# Patient Record
Sex: FEMALE | URBAN - METROPOLITAN AREA
[De-identification: names, ages, dates, MRNs, and addresses within clinical notes are randomized per-mention and may not be internally consistent; named-entity substitution may affect disease eponyms.]

---

## 2019-02-14 ENCOUNTER — APPOINTMENT (RX ONLY)
Dept: URBAN - METROPOLITAN AREA CLINIC 4 | Facility: CLINIC | Age: 49
Setting detail: DERMATOLOGY
End: 2019-02-14

## 2019-02-14 DIAGNOSIS — D49.2 NEOPLASM OF UNSPECIFIED BEHAVIOR OF BONE, SOFT TISSUE, AND SKIN: ICD-10-CM

## 2019-02-14 DIAGNOSIS — L57.8 OTHER SKIN CHANGES DUE TO CHRONIC EXPOSURE TO NONIONIZING RADIATION: ICD-10-CM

## 2019-02-14 DIAGNOSIS — L72.8 OTHER FOLLICULAR CYSTS OF THE SKIN AND SUBCUTANEOUS TISSUE: ICD-10-CM

## 2019-02-14 PROCEDURE — ? BIOPSY BY PUNCH METHOD

## 2019-02-14 PROCEDURE — ? ADDITIONAL NOTES

## 2019-02-14 PROCEDURE — 11104 PUNCH BX SKIN SINGLE LESION: CPT

## 2019-02-14 PROCEDURE — 99202 OFFICE O/P NEW SF 15 MIN: CPT | Mod: 25

## 2019-02-14 PROCEDURE — ? COUNSELING

## 2019-02-14 ASSESSMENT — LOCATION DETAILED DESCRIPTION DERM
LOCATION DETAILED: RIGHT CHIN
LOCATION DETAILED: LEFT CENTRAL MALAR CHEEK

## 2019-02-14 ASSESSMENT — LOCATION SIMPLE DESCRIPTION DERM
LOCATION SIMPLE: CHIN
LOCATION SIMPLE: LEFT CHEEK

## 2019-02-14 ASSESSMENT — LOCATION ZONE DERM: LOCATION ZONE: FACE

## 2019-02-14 NOTE — HPI: SKIN LESION
Is This A New Presentation, Or A Follow-Up?: Skin Lesion
How Severe Is Your Skin Lesion?: mild
Has Your Skin Lesion Been Treated?: been treated
Additional History: Patient started using antibiotic ointment and Acyclovir with no improvement. \\nPatient also tried course of Keflex and Augmentin.
When Was It Treated?: 07/01/2018

## 2019-02-14 NOTE — PROCEDURE: ADDITIONAL NOTES
Additional Notes: Seen and evaluated by Dr. Johnson: Recommend a punch biopsy to rule out cyst vs BCC\\nDiscussed Treatment and risks in detail with patient . If it is a confirmed cyst, recommend excisional removal with Dr Raza.
Detail Level: Simple

## 2019-02-14 NOTE — PROCEDURE: BIOPSY BY PUNCH METHOD
Lab: [id number]
Hemostasis: Electrocautery
Patient Will Remove Sutures At Home?: No
X Depth Of Punch In Cm (Optional): 0
Biopsy Type: H and E
Epidermal Sutures: 5-0 Ethilon
Was A Bandage Applied: Yes
Home Suture Removal Text: Patient was provided a home suture removal kit and will remove their sutures at home.  If they have any questions or difficulties they will call the office.
Lab Facility: 
Notification Instructions: Patient will be notified of biopsy results. However, patient instructed to call the office if not contacted within 2 weeks.
Consent: Written consent was obtained and risks were reviewed including but not limited to scarring, infection, bleeding, scabbing, incomplete removal, nerve damage and allergy to anesthesia.
Dressing: bandage
Punch Size In Mm: 3
Detail Level: Detailed
Billing Type: Third-Party Bill
Anesthesia Type: 1% lidocaine with epinephrine
Post-Care Instructions: I reviewed with the patient in detail post-care instructions. Patient is to keep the biopsy site dry overnight, and then apply bacitracin twice daily until healed. Patient may apply hydrogen peroxide soaks to remove any crusting.
Wound Care: Aquaphor
Suture Removal: 14 days

## 2020-05-08 ENCOUNTER — HOSPITAL ENCOUNTER (OUTPATIENT)
Dept: RADIOLOGY | Facility: MEDICAL CENTER | Age: 50
End: 2020-05-08
Payer: COMMERCIAL

## 2020-05-22 ENCOUNTER — HOSPITAL ENCOUNTER (OUTPATIENT)
Dept: RADIOLOGY | Facility: MEDICAL CENTER | Age: 50
End: 2020-05-22
Attending: NURSE PRACTITIONER
Payer: COMMERCIAL

## 2020-05-22 DIAGNOSIS — Z12.31 VISIT FOR SCREENING MAMMOGRAM: ICD-10-CM

## 2020-05-22 PROCEDURE — 77067 SCR MAMMO BI INCL CAD: CPT

## 2020-12-30 ENCOUNTER — EH NON-PROVIDER (OUTPATIENT)
Dept: OCCUPATIONAL MEDICINE | Facility: CLINIC | Age: 50
End: 2020-12-30

## 2020-12-30 ENCOUNTER — EMPLOYEE HEALTH (OUTPATIENT)
Dept: OCCUPATIONAL MEDICINE | Facility: CLINIC | Age: 50
End: 2020-12-30

## 2020-12-30 ENCOUNTER — HOSPITAL ENCOUNTER (OUTPATIENT)
Facility: MEDICAL CENTER | Age: 50
End: 2020-12-30
Attending: NURSE PRACTITIONER
Payer: COMMERCIAL

## 2020-12-30 DIAGNOSIS — Z02.1 PRE-EMPLOYMENT DRUG SCREENING: ICD-10-CM

## 2020-12-30 DIAGNOSIS — Z02.1 PRE-EMPLOYMENT HEALTH SCREENING EXAMINATION: ICD-10-CM

## 2020-12-30 DIAGNOSIS — Z02.1 PRE-EMPLOYMENT DRUG SCREENING: Primary | ICD-10-CM

## 2020-12-30 LAB
AMP AMPHETAMINE: NORMAL
BAR BARBITURATES: NORMAL
BZO BENZODIAZEPINES: NORMAL
COC COCAINE: NORMAL
INT CON NEG: NORMAL
INT CON POS: NORMAL
MDMA ECSTASY: NORMAL
MET METHAMPHETAMINES: NORMAL
MTD METHADONE: NORMAL
OPI OPIATES: NORMAL
OXY OXYCODONE: NORMAL
PCP PHENCYCLIDINE: NORMAL
POC URINE DRUG SCREEN OCDRS: NEGATIVE
THC: NORMAL
VZV IGG SER IA-ACNC: 3.59

## 2020-12-30 PROCEDURE — 8915 PR COMPREHENSIVE PHYSICAL: Performed by: NURSE PRACTITIONER

## 2020-12-30 PROCEDURE — 86480 TB TEST CELL IMMUN MEASURE: CPT | Performed by: NURSE PRACTITIONER

## 2020-12-30 PROCEDURE — 80305 DRUG TEST PRSMV DIR OPT OBS: CPT | Performed by: NURSE PRACTITIONER

## 2020-12-30 PROCEDURE — 86762 RUBELLA ANTIBODY: CPT | Performed by: NURSE PRACTITIONER

## 2020-12-30 PROCEDURE — 86765 RUBEOLA ANTIBODY: CPT | Performed by: NURSE PRACTITIONER

## 2020-12-30 PROCEDURE — 86787 VARICELLA-ZOSTER ANTIBODY: CPT | Performed by: NURSE PRACTITIONER

## 2020-12-30 PROCEDURE — 90715 TDAP VACCINE 7 YRS/> IM: CPT | Performed by: NURSE PRACTITIONER

## 2020-12-30 PROCEDURE — 86735 MUMPS ANTIBODY: CPT | Performed by: NURSE PRACTITIONER

## 2020-12-30 PROCEDURE — 90686 IIV4 VACC NO PRSV 0.5 ML IM: CPT | Performed by: NURSE PRACTITIONER

## 2021-01-01 LAB
GAMMA INTERFERON BACKGROUND BLD IA-ACNC: 0.02 IU/ML
M TB IFN-G BLD-IMP: NEGATIVE
M TB IFN-G CD4+ BCKGRND COR BLD-ACNC: 0 IU/ML
MEV IGG SER IA-ACNC: 2.97
MITOGEN IGNF BCKGRD COR BLD-ACNC: >10 IU/ML
MUV IGG SER IA-ACNC: 2.1
QFT TB2 - NIL TBQ2: 0 IU/ML
RUBV AB SER QL: 202 IU/ML

## 2021-01-12 DIAGNOSIS — Z23 NEED FOR VACCINATION: ICD-10-CM

## 2021-01-15 ENCOUNTER — EH NON-PROVIDER (OUTPATIENT)
Dept: OCCUPATIONAL MEDICINE | Facility: CLINIC | Age: 51
End: 2021-01-15

## 2021-01-15 DIAGNOSIS — Z71.85 IMMUNIZATION COUNSELING: ICD-10-CM

## 2022-01-23 ENCOUNTER — APPOINTMENT (OUTPATIENT)
Dept: RADIOLOGY | Facility: IMAGING CENTER | Age: 52
End: 2022-01-23
Attending: NURSE PRACTITIONER
Payer: COMMERCIAL

## 2022-01-23 ENCOUNTER — OFFICE VISIT (OUTPATIENT)
Dept: URGENT CARE | Facility: PHYSICIAN GROUP | Age: 52
End: 2022-01-23
Payer: COMMERCIAL

## 2022-01-23 VITALS
RESPIRATION RATE: 20 BRPM | BODY MASS INDEX: 24.99 KG/M2 | OXYGEN SATURATION: 98 % | TEMPERATURE: 98.8 F | HEIGHT: 65 IN | WEIGHT: 150 LBS | SYSTOLIC BLOOD PRESSURE: 106 MMHG | HEART RATE: 91 BPM | DIASTOLIC BLOOD PRESSURE: 58 MMHG

## 2022-01-23 DIAGNOSIS — S97.82XA CRUSHING INJURY OF LEFT FOOT, INITIAL ENCOUNTER: ICD-10-CM

## 2022-01-23 DIAGNOSIS — S92.422A CLOSED DISPLACED FRACTURE OF DISTAL PHALANX OF LEFT GREAT TOE, INITIAL ENCOUNTER: Primary | ICD-10-CM

## 2022-01-23 PROBLEM — M25.519 SHOULDER PAIN: Status: ACTIVE | Noted: 2018-04-06

## 2022-01-23 PROBLEM — M54.9 BACKACHE: Status: ACTIVE | Noted: 2019-07-19

## 2022-01-23 PROBLEM — M72.2 PLANTAR FASCIITIS, RIGHT: Status: ACTIVE | Noted: 2017-05-22

## 2022-01-23 PROBLEM — K21.9 GASTROESOPHAGEAL REFLUX DISEASE: Status: ACTIVE | Noted: 2022-01-23

## 2022-01-23 PROBLEM — L72.0 EPIDERMOID CYST OF SKIN OF SCALP: Status: ACTIVE | Noted: 2017-03-14

## 2022-01-23 PROBLEM — D23.39 OTHER BENIGN NEOPLASM OF SKIN OF OTHER PARTS OF FACE: Status: ACTIVE | Noted: 2017-11-06

## 2022-01-23 PROBLEM — F90.9 ATTENTION DEFICIT DISORDER OF ADULT WITH HYPERACTIVITY: Status: ACTIVE | Noted: 2018-04-06

## 2022-01-23 PROBLEM — B00.9 HERPES SIMPLEX: Status: ACTIVE | Noted: 2017-08-01

## 2022-01-23 PROBLEM — M79.644 PAIN IN FINGER OF RIGHT HAND: Status: ACTIVE | Noted: 2018-04-06

## 2022-01-23 PROBLEM — E78.5 HYPERLIPIDEMIA: Status: ACTIVE | Noted: 2022-01-23

## 2022-01-23 PROBLEM — F41.0 PANIC ATTACK: Status: ACTIVE | Noted: 2017-07-14

## 2022-01-23 PROBLEM — F99 MENTAL DISORDER: Status: ACTIVE | Noted: 2017-03-14

## 2022-01-23 PROBLEM — D49.2 NEOPLASM OF SKIN OF SCALP: Status: ACTIVE | Noted: 2017-03-14

## 2022-01-23 PROCEDURE — 99204 OFFICE O/P NEW MOD 45 MIN: CPT | Performed by: NURSE PRACTITIONER

## 2022-01-23 PROCEDURE — 73630 X-RAY EXAM OF FOOT: CPT | Mod: TC,LT | Performed by: RADIOLOGY

## 2022-01-23 RX ORDER — HYDROCODONE BITARTRATE AND ACETAMINOPHEN 5; 325 MG/1; MG/1
1 TABLET ORAL EVERY 4 HOURS PRN
Qty: 7 TABLET | Refills: 0 | Status: SHIPPED | OUTPATIENT
Start: 2022-01-23 | End: 2022-01-25

## 2022-01-23 RX ORDER — DEXTROAMPHETAMINE SACCHARATE, AMPHETAMINE ASPARTATE, DEXTROAMPHETAMINE SULFATE AND AMPHETAMINE SULFATE 2.5; 2.5; 2.5; 2.5 MG/1; MG/1; MG/1; MG/1
10 TABLET ORAL 2 TIMES DAILY
COMMUNITY
Start: 2022-01-15

## 2022-01-23 RX ORDER — KETOROLAC TROMETHAMINE 30 MG/ML
30 INJECTION, SOLUTION INTRAMUSCULAR; INTRAVENOUS ONCE
Status: COMPLETED | OUTPATIENT
Start: 2022-01-23 | End: 2022-01-23

## 2022-01-23 RX ORDER — ALBUTEROL SULFATE 90 UG/1
AEROSOL, METERED RESPIRATORY (INHALATION)
COMMUNITY
Start: 2021-10-26

## 2022-01-23 RX ORDER — SIMVASTATIN 20 MG
20 TABLET ORAL
COMMUNITY
Start: 2022-01-14

## 2022-01-23 RX ORDER — ALPRAZOLAM 0.5 MG/1
TABLET ORAL
COMMUNITY
Start: 2021-10-26

## 2022-01-23 RX ORDER — BUPROPION HYDROCHLORIDE 100 MG/1
100 TABLET, EXTENDED RELEASE ORAL 2 TIMES DAILY
COMMUNITY
Start: 2022-01-14 | End: 2022-02-22

## 2022-01-23 RX ORDER — PAROXETINE 10 MG/1
10 TABLET, FILM COATED ORAL DAILY
COMMUNITY
Start: 2021-12-16

## 2022-01-23 RX ADMIN — KETOROLAC TROMETHAMINE 30 MG: 30 INJECTION, SOLUTION INTRAMUSCULAR; INTRAVENOUS at 17:08

## 2022-01-23 ASSESSMENT — ENCOUNTER SYMPTOMS
SENSORY CHANGE: 1
FALLS: 0
MYALGIAS: 1
NECK PAIN: 0
TINGLING: 1
FOCAL WEAKNESS: 1
BACK PAIN: 0

## 2022-01-24 ENCOUNTER — OFFICE VISIT (OUTPATIENT)
Dept: MEDICAL GROUP | Facility: OTHER | Age: 52
End: 2022-01-24
Payer: COMMERCIAL

## 2022-01-24 DIAGNOSIS — M79.671 FOOT PAIN, RIGHT: ICD-10-CM

## 2022-01-24 PROCEDURE — 99213 OFFICE O/P EST LOW 20 MIN: CPT | Performed by: FAMILY MEDICINE

## 2022-01-24 ASSESSMENT — ENCOUNTER SYMPTOMS
PSYCHIATRIC NEGATIVE: 1
NEUROLOGICAL NEGATIVE: 1
RESPIRATORY NEGATIVE: 1
CONSTITUTIONAL NEGATIVE: 1
EYES NEGATIVE: 1
CARDIOVASCULAR NEGATIVE: 1
GASTROINTESTINAL NEGATIVE: 1

## 2022-01-24 NOTE — PROGRESS NOTES
Subjective:     Candace Jaramillo is a 51 y.o. female who presents for Foot Problem (left foot,painful,swollen,bruise,today)      Foot Problem  Associated symptoms include myalgias. Pertinent negatives include no neck pain.     Pt presents for evaluation of a new problem. Candace is a pleasant 51-year-old female presents to urgent care today after dropping a 45 pound weight on her left foot earlier this afternoon at the gym.  She has been using ice, elevation and rest for her symptoms.  She does note numbness and tingling in first second and third toe.  Range of motion is limited due to pain.  Pain is rated as moderate.  She has not used any ibuprofen or Tylenol for relief of symptoms.  She is currently using crutches during ambulation.    Review of Systems   Musculoskeletal: Positive for joint pain and myalgias. Negative for back pain, falls and neck pain.   Neurological: Positive for tingling, sensory change and focal weakness.       PMH: No past medical history on file.  ALLERGIES: No Known Allergies  SURGHX: No past surgical history on file.  SOCHX:   Social History     Socioeconomic History   • Marital status: Single     Spouse name: Not on file   • Number of children: Not on file   • Years of education: Not on file   • Highest education level: Not on file   Occupational History   • Not on file   Tobacco Use   • Smoking status: Not on file   • Smokeless tobacco: Not on file   Substance and Sexual Activity   • Alcohol use: Not on file   • Drug use: Not on file   • Sexual activity: Not on file   Other Topics Concern   • Not on file   Social History Narrative   • Not on file     Social Determinants of Health     Financial Resource Strain:    • Difficulty of Paying Living Expenses: Not on file   Food Insecurity:    • Worried About Running Out of Food in the Last Year: Not on file   • Ran Out of Food in the Last Year: Not on file   Transportation Needs:    • Lack of Transportation (Medical): Not on file   • Lack of  "Transportation (Non-Medical): Not on file   Physical Activity:    • Days of Exercise per Week: Not on file   • Minutes of Exercise per Session: Not on file   Stress:    • Feeling of Stress : Not on file   Social Connections:    • Frequency of Communication with Friends and Family: Not on file   • Frequency of Social Gatherings with Friends and Family: Not on file   • Attends [employer] Services: Not on file   • Active Member of Clubs or Organizations: Not on file   • Attends Club or Organization Meetings: Not on file   • Marital Status: Not on file   Intimate Partner Violence:    • Fear of Current or Ex-Partner: Not on file   • Emotionally Abused: Not on file   • Physically Abused: Not on file   • Sexually Abused: Not on file   Housing Stability:    • Unable to Pay for Housing in the Last Year: Not on file   • Number of Places Lived in the Last Year: Not on file   • Unstable Housing in the Last Year: Not on file     FH: No family history on file.      Objective:   /58 (BP Location: Left arm, Patient Position: Sitting, BP Cuff Size: Adult)   Pulse 91   Temp 37.1 °C (98.8 °F) (Temporal)   Resp 20   Ht 1.651 m (5' 5\")   Wt 68 kg (150 lb)   SpO2 98%   BMI 24.96 kg/m²     Physical Exam  Vitals and nursing note reviewed.   Constitutional:       General: She is not in acute distress.     Appearance: Normal appearance. She is normal weight. She is not ill-appearing or toxic-appearing.   HENT:      Head: Normocephalic.      Right Ear: External ear normal.      Left Ear: External ear normal.      Nose: No congestion or rhinorrhea.      Mouth/Throat:      Pharynx: No oropharyngeal exudate or posterior oropharyngeal erythema.   Eyes:      General:         Right eye: No discharge.         Left eye: No discharge.      Pupils: Pupils are equal, round, and reactive to light.   Pulmonary:      Effort: Pulmonary effort is normal.   Abdominal:      General: Abdomen is flat.   Musculoskeletal:      Cervical back: Normal " range of motion and neck supple.      Left foot: Decreased range of motion. Normal capillary refill. Swelling, tenderness and bony tenderness present. No deformity. Normal pulse.      Comments: Positive for ecchymosis, swelling and decreased range of motion to left distal foot including first second and third digit.    Skin:     General: Skin is dry.   Neurological:      General: No focal deficit present.      Mental Status: She is alert and oriented to person, place, and time. Mental status is at baseline.   Psychiatric:         Mood and Affect: Mood normal.         Behavior: Behavior normal.         Thought Content: Thought content normal.         Judgment: Judgment normal.       DX foot left: IMPRESSION:     Comminuted fracture of the first distal phalanx.  Assessment/Plan:   Assessment    1. Closed displaced fracture of distal phalanx of left great toe, initial encounter  Referral to Orthopedics   2. Crushing injury of left foot, initial encounter  DX-FOOT-COMPLETE 3+ LEFT    Referral to Orthopedics   X-ray results discussed with patient.  I did personally review her x-rays and agree with the radiologist interpretation of the findings.  Left first and second toes fanny taped together.  She was given Toradol injection in clinic for relief of her pain.  Norco sent to pharmacy for further relief.   reviewed and I do not believe that she is at an increased risk of abuse of this medication.  Risk for compartment syndrome discussed with patient.  She was originally referred to follow-up with Ortho for further treatment and evaluation.  She does have crutches and use.  Patient to remain nonweightbearing until she follows up with orthopedics.  AVS handout given and reviewed with patient. Pt educated on red flags and when to seek treatment back in ER or UC.

## 2022-01-24 NOTE — ASSESSMENT & PLAN NOTE
Patient with a fracture to her right great toe.  We will go ahead and put her in a hard soled shoe and have her follow-up in 2 to 3 weeks.  NSAIDs as needed for pain

## 2022-01-24 NOTE — PATIENT INSTRUCTIONS
Toe Fracture  A toe fracture is a break in one of the toe bones (phalanges). A toe fracture may be:  · A crack in the surface of the bone (stress fracture). This often occurs in athletes.  · A break all the way through the bone (complete fracture).  What are the causes?  This condition may be caused by:  · Direct impact, such as from dropping a heavy object on your toe.  · Stubbing your toe.  · Twisting or stretching your toe out of place.  · Overuse or repetitive exercise.  What increases the risk?  You are more likely to develop this condition if you:  · Play contact sports.  · Have a condition that causes the bones to become thin and brittle (osteoporosis).  · Have a low calcium level.  What are the signs or symptoms?  The main symptoms of this condition are swelling and pain in the toe. Other symptoms include:  · Bruising.  · Stiffness.  · Numbness.  · A change in the way the toe looks.  · Broken bones that poke through the skin.  · Blood beneath the toenail.  How is this diagnosed?  This condition is diagnosed with a physical exam. You may also have X-rays.  How is this treated?  Treatment for this condition depends on the type of fracture and its severity. Treatment may include:  · Taping the broken toe to a toe that is next to it (fanny taping). This is the most common treatment for fractures in which the bone has not moved out of place (non-displaced fracture).  · Wearing a shoe that has a wide, rigid sole to protect the toe and to limit its movement.  · Wearing a walking cast.  · Having a procedure to move the toe back into place.  · Surgery. This may be needed if the:  ? Pieces of broken bone are out of place (displaced).  ? Bone breaks through the skin.  · Physical therapy. This is done to help regain movement and strength in the toe.  You may need follow-up X-rays to make sure that the bone is healing well and staying in position.  Follow these instructions at home:  If you have a shoe:  · Wear the shoe  as told by your health care provider. Remove it only as told by your health care provider.  · Loosen the shoe if your toes tingle, become numb, or turn cold and blue.  · Keep the shoe clean and dry.  If you have a cast:  · Do not put pressure on any part of the cast until it is fully hardened. This may take several hours.  · Do not stick anything inside the cast to scratch your skin. Doing that increases your risk of infection.  · Check the skin around the cast every day. Tell your health care provider about any concerns.  · You may put lotion on dry skin around the edges of the cast. Do not put lotion on the skin underneath the cast.  · Keep the cast clean and dry.  Bathing  · Do not take baths, swim, or use a hot tub until your health care provider approves. Ask your health care provider if you can take showers.  · If the shoe or cast is not waterproof:  ? Do not let it get wet.  ? Cover it with a watertight covering when you take a bath or a shower.  Activity  · Do not use the injured foot to support your body weight until your health care provider says that you can. Use crutches as directed.  · Ask your health care provider:  ? What activities are safe for you during recovery.  ? What activities you need to avoid.  · Do physical therapy exercises as directed.  Driving  · Do not drive or use heavy machinery while taking pain medicine.  · Do not drive while wearing a cast on a foot that you use for driving.  Managing pain, stiffness, and swelling    · If directed, put ice on painful areas:  ? Put ice in a plastic bag.  ? Place a towel between your skin and the bag.  § If you have a shoe, remove it as told by your health care provider.  § If you have a cast, place a towel between your cast and the bag.  ? Leave the ice on for 20 minutes, 2-3 times per day.  · Raise (elevate) the injured area above the level of your heart while you are sitting or lying down.  General instructions  · If your toe was treated with  fanny taping, follow your health care provider's instructions for changing the gauze and tape. Change it more often if:  ? The gauze and tape get wet. If this happens, dry the space between the toes.  ? The gauze and tape are too tight and cause your toe to become pale or numb.  · If you were not given a protective shoe, wear sturdy, supportive shoes. Your shoes should not pinch your toes and should not fit tightly against your toes.  · Do not use any products that contain nicotine or tobacco, such as cigarettes and e-cigarettes. These can delay bone healing. If you need help quitting, ask your health care provider.  · Take over-the-counter and prescription medicines only as told by your health care provider.  · Keep all follow-up visits as told by your health care provider. This is important.  Contact a health care provider if you have:  · Pain that gets worse or does not get better with medicine.  · A fever.  · A bad smell coming from your cast.  Get help right away if you have:  · Any of the following in your toes or your foot:  ? Numbness that gets worse.  ? Tingling.  ? Coldness.  ? Blue skin.  · Redness or swelling that gets worse.  · Pain that suddenly becomes severe.  Summary  · A toe fracture is a break in one of the toe bones (phalanges).  · Treatment depends on how severe your fracture is and how the pieces of the broken bone line up with each other. Treatment may include fanny taping, wearing a shoe or a cast, or using crutches.  · Ice and elevate your foot to help lessen the pain and swelling.  This information is not intended to replace advice given to you by your health care provider. Make sure you discuss any questions you have with your health care provider.  Document Released: 12/15/2001 Document Revised: 04/02/2019 Document Reviewed: 01/29/2019  Elsevier Patient Education © 2020 [employer]

## 2022-01-24 NOTE — PROGRESS NOTES
CommentSubjective:   CC: No chief complaint on file.      HPI: Candace is a 51 y.o. female who presents today for the following problems:    Problem   Foot Pain, Right    Patient was working out yesterday and while changing weights on a barbell dropped a 45 pound weight onto her right foot.  She went to the urgent care and was told she had a fracture.  And that she was to follow-up with orthopedics to see if she needs surgery.  She comes in to see me today for evaluation as to whether or not she should see an orthopedist or will this heal on its own.  She states that her foot is black and blue and that she does have pain to the first and second toes on her right foot.  She denies numbness but states that it hurts         Current Outpatient Medications   Medication Sig Dispense Refill   • amphetamine-dextroamphetamine (ADDERALL) 10 MG Tab Take 10 mg by mouth 2 times a day. FOR 30 DAYS     • ALPRAZolam (XANAX) 0.5 MG Tab      • buPROPion SR (WELLBUTRIN-SR) 100 MG TABLET SR 12 HR Take 100 mg by mouth 2 times a day.     • PARoxetine (PAXIL) 10 MG Tab Take 10 mg by mouth every day.     • simvastatin (ZOCOR) 20 MG Tab Take 20 mg by mouth at bedtime.     • VENTOLIN  (90 Base) MCG/ACT Aero Soln inhalation aerosol      • HYDROcodone-acetaminophen (NORCO) 5-325 MG Tab per tablet Take 1 Tablet by mouth every four hours as needed for up to 2 days. 7 Tablet 0     No current facility-administered medications for this visit.       Social History     Tobacco Use   • Smoking status: Not on file   • Smokeless tobacco: Not on file   Substance Use Topics   • Alcohol use: Not on file   • Drug use: Not on file       Review of Systems   Constitutional: Negative.    HENT: Negative.    Eyes: Negative.    Respiratory: Negative.    Cardiovascular: Negative.    Gastrointestinal: Negative.    Skin: Negative.    Neurological: Negative.    Psychiatric/Behavioral: Negative.          Objective:   There were no vitals filed for this  visit.  There is no height or weight on file to calculate BMI.     Physical Exam  Vitals reviewed.   Constitutional:       Appearance: Normal appearance. She is normal weight.   Musculoskeletal:      Comments: Right foot positive ecchymosis appreciated over the  dorsum of the foot distal aspect including the great toe and second toe.  Tenderness to palpation to the distal metatarsal and phalange of the first and second toes.   Neurological:      General: No focal deficit present.      Mental Status: She is alert and oriented to person, place, and time. Mental status is at baseline.   Psychiatric:         Mood and Affect: Mood normal.        X-rays done at urgent care last night show a fracture of the right great toe.    Assessment & Plan:   Foot pain, right  Patient with a fracture to her right great toe.  We will go ahead and put her in a hard soled shoe and have her follow-up in 2 to 3 weeks.  NSAIDs as needed for pain      Followup: Return in about 3 weeks (around 2/14/2022).    Dewey Branch M.D.    Please note that this dictation was created using voice recognition software. I have made every reasonable attempt to correct obvious errors, but I expect that there are errors of grammar and possibly content that I did not discover before finalizing the note.

## 2022-02-22 ENCOUNTER — OFFICE VISIT (OUTPATIENT)
Dept: MEDICAL GROUP | Facility: OTHER | Age: 52
End: 2022-02-22
Payer: COMMERCIAL

## 2022-02-22 ENCOUNTER — APPOINTMENT (OUTPATIENT)
Dept: RADIOLOGY | Facility: OTHER | Age: 52
End: 2022-02-22
Attending: FAMILY MEDICINE
Payer: COMMERCIAL

## 2022-02-22 VITALS
BODY MASS INDEX: 25.33 KG/M2 | SYSTOLIC BLOOD PRESSURE: 115 MMHG | HEART RATE: 82 BPM | DIASTOLIC BLOOD PRESSURE: 66 MMHG | HEIGHT: 65 IN | TEMPERATURE: 97.4 F | WEIGHT: 152 LBS | RESPIRATION RATE: 14 BRPM | OXYGEN SATURATION: 97 %

## 2022-02-22 DIAGNOSIS — S92.402A CLOSED DISPLACED FRACTURE OF PHALANX OF LEFT GREAT TOE, UNSPECIFIED PHALANX, INITIAL ENCOUNTER: ICD-10-CM

## 2022-02-22 DIAGNOSIS — M54.2 CERVICALGIA: ICD-10-CM

## 2022-02-22 DIAGNOSIS — S92.425D CLOSED NONDISPLACED FRACTURE OF DISTAL PHALANX OF LEFT GREAT TOE WITH ROUTINE HEALING, SUBSEQUENT ENCOUNTER: ICD-10-CM

## 2022-02-22 DIAGNOSIS — Z00.00 EXAMINATION, MEDICAL, GENERAL: ICD-10-CM

## 2022-02-22 DIAGNOSIS — M25.512 ACUTE PAIN OF LEFT SHOULDER: ICD-10-CM

## 2022-02-22 DIAGNOSIS — G56.03 CARPAL TUNNEL SYNDROME ON BOTH SIDES: ICD-10-CM

## 2022-02-22 DIAGNOSIS — R53.82 CHRONIC FATIGUE: ICD-10-CM

## 2022-02-22 PROBLEM — F41.9 ANXIETY: Status: ACTIVE | Noted: 2022-02-22

## 2022-02-22 PROBLEM — M79.644 PAIN IN FINGER OF RIGHT HAND: Status: RESOLVED | Noted: 2018-04-06 | Resolved: 2022-02-22

## 2022-02-22 PROBLEM — M54.9 BACKACHE: Status: RESOLVED | Noted: 2019-07-19 | Resolved: 2022-02-22

## 2022-02-22 PROCEDURE — 99213 OFFICE O/P EST LOW 20 MIN: CPT | Mod: 25 | Performed by: FAMILY MEDICINE

## 2022-02-22 PROCEDURE — 73660 X-RAY EXAM OF TOE(S): CPT | Mod: TC,FY,LT | Performed by: FAMILY MEDICINE

## 2022-02-22 RX ORDER — NAPROXEN 500 MG/1
500 TABLET ORAL 2 TIMES DAILY WITH MEALS
Qty: 60 TABLET | Refills: 1 | Status: SHIPPED | OUTPATIENT
Start: 2022-02-22

## 2022-02-22 RX ORDER — BUPROPION HYDROCHLORIDE 100 MG/1
TABLET, EXTENDED RELEASE ORAL
COMMUNITY
Start: 2019-02-05 | End: 2022-02-22

## 2022-02-22 ASSESSMENT — ENCOUNTER SYMPTOMS
WEAKNESS: 1
CONSTITUTIONAL NEGATIVE: 1
TINGLING: 1

## 2022-02-22 NOTE — PROGRESS NOTES
Subjective:   Candace Jaramillo is a 51 y.o. female here for the evaluation and management of Toe Injury (LT BIG TOE,B/L HAND RIGHT WORST )    Great toe fracture of the distal phalanx of the left great toe-ongoing discomfort at the base of the second and third toes with associated ecchymoses, gradually healing, imaging reviewed and discussed    Wrist discomfort with associated numbness-gradual onset, originating in the wrist with radiation proximally, worse at night with associated numbness in the median nerve distribution    Chronic neck discomfort with ongoing supportive management      Problem   Anxiety   Carpal Tunnel Syndrome On Both Sides   Nondisplaced Fracture of Distal Phalanx of Left Great Toe With Routine Healing   Backache (Resolved)   Pain in Finger of Right Hand (Resolved)   Fatigue (Resolved)   Cough (Resolved)       Review of Systems   Constitutional: Negative.    Musculoskeletal: Positive for joint pain.   Neurological: Positive for tingling and weakness.       Current Outpatient Medications   Medication Sig Dispense Refill   • amphetamine-dextroamphetamine (ADDERALL) 10 MG Tab Take 10 mg by mouth 2 times a day. FOR 30 DAYS     • ALPRAZolam (XANAX) 0.5 MG Tab      • PARoxetine (PAXIL) 10 MG Tab Take 10 mg by mouth every day.     • simvastatin (ZOCOR) 20 MG Tab Take 20 mg by mouth at bedtime.     • VENTOLIN  (90 Base) MCG/ACT Aero Soln inhalation aerosol        No current facility-administered medications for this visit.     Allergies  Patient has no known allergies.    History reviewed. No pertinent past medical history.  Patient Active Problem List    Diagnosis Date Noted   • Anxiety 02/22/2022   • Carpal tunnel syndrome on both sides 02/22/2022   • Nondisplaced fracture of distal phalanx of left great toe with routine healing 02/22/2022   • Foot pain, right 01/24/2022   • Gastroesophageal reflux disease 01/23/2022   • Hyperlipidemia 01/23/2022   • Attention deficit disorder of adult with  "hyperactivity 04/06/2018   • Shoulder pain 04/06/2018   • Other benign neoplasm of skin of other parts of face 11/06/2017   • Herpes simplex 08/01/2017   • Panic attack 07/14/2017   • Plantar fasciitis, right 05/22/2017   • Mental disorder 03/14/2017   • Epidermoid cyst of skin of scalp 03/14/2017   • Neoplasm of skin of scalp 03/14/2017   • Hip pain, bilateral 07/15/2016   • Adjustment disorder with anxiety 02/12/2016   • Spasm of back muscles 02/12/2016   • Nicotine addiction 02/12/2016   • Bunion 05/08/2015   • Cervicalgia 01/22/2015       Past Surgical History  History reviewed. No pertinent surgical history.     Objective:     Vitals:    02/22/22 1015   BP: 115/66   BP Location: Right arm   Patient Position: Sitting   BP Cuff Size: Adult   Pulse: 82   Resp: 14   Temp: 36.3 °C (97.4 °F)   SpO2: 97%   Weight: 68.9 kg (152 lb)   Height: 1.651 m (5' 5\")     Body mass index is 25.29 kg/m².     Physical Exam  Constitutional:       Appearance: Normal appearance.   HENT:      Head: Normocephalic.   Eyes:      Extraocular Movements: Extraocular movements intact.      Conjunctiva/sclera: Conjunctivae normal.   Neurological:      Mental Status: She is alert. Mental status is at baseline.   Psychiatric:         Mood and Affect: Mood normal.         Behavior: Behavior normal.     Left foot-inspection demonstrates resolving ecchymoses at the base of the toes, mild tenderness to palpation at the base of the toes, adequate strength and sensation, with only mild tenderness in the area of the fracture over the distal aspect of the great toe    Neck-normal inspection, tender at the base of the neck posteriorly, full painless range of motion with normal strength in both upper extremities    Wrist examination-normal inspection with no observed atrophy, strength was 5 out of 5 throughout both hands, sensation was slightly decreased in the index fingers with positive Phalen's and Tinel's    Assessment and Plan:   Candace Jaramillo is a " 51 y.o. female with a Toe Injury (LT BIG TOE,B/L HAND RIGHT WORST )     The following was discussed with the patient today.    Problem List Items Addressed This Visit     Cervicalgia    Shoulder pain    Relevant Orders    CBC WITHOUT DIFFERENTIAL    Comp Metabolic Panel    Lipid Profile    TSH WITH REFLEX TO FT4    CRP QUANTITIVE (NON-CARDIAC)    CCP ANTIBODY    Carpal tunnel syndrome on both sides    Nondisplaced fracture of distal phalanx of left great toe with routine healing    RESOLVED: Fatigue    Relevant Orders    CBC WITHOUT DIFFERENTIAL    Comp Metabolic Panel    Lipid Profile    TSH WITH REFLEX TO FT4    CRP QUANTITIVE (NON-CARDIAC)    CCP ANTIBODY      Other Visit Diagnoses     Examination, medical, general        Relevant Orders    CBC WITHOUT DIFFERENTIAL    Comp Metabolic Panel    Lipid Profile        Imaging reviewed and discussed with the patient and her partner, fracture is healing without complication and I expect the left foot to improve.  Regarding her bilateral upper extremity symptoms the history and exam is most consistent with bilateral carpal tunnel syndrome with recommendation for conservative management with night splinting and anti-inflammatories, laboratory studies recommended and they requested evaluation for rheumatoid arthritis which was included, close follow-up in 3 to 4 weeks  Followup: No follow-ups on file.    Elver Graham M.D.    Please note that this dictation was created using voice recognition software. I have made every reasonable attempt to correct obvious errors, but I expect that there are errors of grammar and possibly content that I did not discover before finalizing the note.

## 2023-05-05 ENCOUNTER — APPOINTMENT (OUTPATIENT)
Dept: RADIOLOGY | Facility: CLINIC | Age: 53
End: 2023-05-05
Attending: FAMILY MEDICINE
Payer: COMMERCIAL

## 2023-05-05 ENCOUNTER — OFFICE VISIT (OUTPATIENT)
Dept: MEDICAL GROUP | Facility: CLINIC | Age: 53
End: 2023-05-05
Payer: COMMERCIAL

## 2023-05-05 VITALS
BODY MASS INDEX: 29.99 KG/M2 | TEMPERATURE: 98.6 F | HEART RATE: 95 BPM | SYSTOLIC BLOOD PRESSURE: 126 MMHG | WEIGHT: 180 LBS | RESPIRATION RATE: 17 BRPM | OXYGEN SATURATION: 97 % | DIASTOLIC BLOOD PRESSURE: 78 MMHG | HEIGHT: 65 IN

## 2023-05-05 DIAGNOSIS — M79.672 FOOT PAIN, LEFT: ICD-10-CM

## 2023-05-05 DIAGNOSIS — M72.2 PLANTAR FASCIITIS OF LEFT FOOT: ICD-10-CM

## 2023-05-05 PROCEDURE — 20550 NJX 1 TENDON SHEATH/LIGAMENT: CPT | Mod: LT | Performed by: FAMILY MEDICINE

## 2023-05-05 PROCEDURE — 73630 X-RAY EXAM OF FOOT: CPT | Mod: TC,LT | Performed by: FAMILY MEDICINE

## 2023-05-05 ASSESSMENT — PATIENT HEALTH QUESTIONNAIRE - PHQ9: CLINICAL INTERPRETATION OF PHQ2 SCORE: 0

## 2023-05-05 ASSESSMENT — ENCOUNTER SYMPTOMS
NEUROLOGICAL NEGATIVE: 1
CARDIOVASCULAR NEGATIVE: 1
PSYCHIATRIC NEGATIVE: 1
RESPIRATORY NEGATIVE: 1
CONSTITUTIONAL NEGATIVE: 1
EYES NEGATIVE: 1
GASTROINTESTINAL NEGATIVE: 1

## 2023-05-05 NOTE — PROGRESS NOTES
Subjective:   CC:   Chief Complaint   Patient presents with    Follow-Up       HPI: Candace is a 52 y.o. female who presents today for the following problems:    Problem   Plantar Fasciitis of Left Foot    Patient comes in to see me today for left foot pain.  She is well-known to me.  She has had a history of Planter fasciitis on both feet.  We have injected the right before but now she states that she is having the same pain on her left-hand side.  She states it is worse when she gets up in the morning and worse with the first couple of steps of the day.  In addition she states that the pain will return if she sits for a long period of time and then walks.  She denies any trauma and states that the foot has been getting worse and worse.  She has been using over-the-counter pain remedies and has been using a roller ball for her foot and trying to ice it with little to no improvement.         Current Outpatient Medications   Medication Sig Dispense Refill    ALPRAZolam (XANAX) 0.5 MG Tab       VENTOLIN  (90 Base) MCG/ACT Aero Soln inhalation aerosol       naproxen (NAPROSYN) 500 MG Tab Take 1 Tablet by mouth 2 times a day with meals. 60 Tablet 1    amphetamine-dextroamphetamine (ADDERALL) 10 MG Tab Take 10 mg by mouth 2 times a day. FOR 30 DAYS      PARoxetine (PAXIL) 10 MG Tab Take 10 mg by mouth every day.      simvastatin (ZOCOR) 20 MG Tab Take 20 mg by mouth at bedtime.       No current facility-administered medications for this visit.       Social History     Tobacco Use    Smoking status: Former    Smokeless tobacco: Never   Vaping Use    Vaping Use: Never used   Substance Use Topics    Alcohol use: Yes     Comment: SOCIAL     Drug use: Never       Review of Systems   Constitutional: Negative.    HENT: Negative.     Eyes: Negative.    Respiratory: Negative.     Cardiovascular: Negative.    Gastrointestinal: Negative.    Skin: Negative.    Neurological: Negative.    Psychiatric/Behavioral: Negative.      "    Objective:     Vitals:    05/05/23 0926   BP: 126/78   BP Location: Left arm   Patient Position: Sitting   BP Cuff Size: Adult   Pulse: 95   Resp: 17   Temp: 37 °C (98.6 °F)   TempSrc: Temporal   SpO2: 97%   Weight: 81.6 kg (180 lb)   Height: 1.651 m (5' 5\")     Body mass index is 29.95 kg/m².     Physical Exam  Vitals reviewed.   Constitutional:       Appearance: Normal appearance.   HENT:      Head: Normocephalic and atraumatic.   Cardiovascular:      Rate and Rhythm: Normal rate and regular rhythm.   Pulmonary:      Effort: Pulmonary effort is normal.      Breath sounds: Normal breath sounds.   Musculoskeletal:      Comments: Patient's left foot-no erythema or swelling appreciated  No tenderness to palpation to the Achilles tendon the retrocalcaneal bursa, the Achilles insertion, with calcaneal squeeze, to the level medial and lateral malleolus, to the anterior talofibular ligament, to the calcaneofibular ligament, to the fifth metatarsal head, to the navicular.  No tenderness to palpation to the arch.  Positive tenderness to palpation to the origin of the plantar fascia.  Range of motion of the ankle is 100% in all directions.  Movement of toes is 100% in all directions.     Neurological:      Mental Status: She is alert.     Diagnosis of plantar fasciitis discussed with the patient.  Patient decided that she would like to have an injection done today.    Patient consented  Patient prepped in usual fashion  1 mL of 1% lidocaine was combined with 1/2 mL of Kenalog (40 mg/mL) and was placed in a 5 cc syringe.  The syringe was capped with a 25-gauge 1-1/2 inch needle.  The injection was placed on the medial aspect of the distal heel and delivered to the origin of the plantar fascia in a bolus.  Ethyl chloride was used as a topical anesthetic.  Band-Aid was placed and patient tolerated the procedure very well with some immediate relief.  Assessment & Plan:   Plantar fasciitis of left foot  Patient is diagnosis " is Planter fasciitis  X-rays ordered and viewed by me today show no fracture or recurrent dislocation.  Patient was injected today and I recommend that she follow-up with me in about 2 to 3 months if she is not any better.  Also recommend using some shoe insoles to help with her arch support.      Followup: Return in about 1 month (around 6/5/2023), or if symptoms worsen or fail to improve.    Dewey Branch M.D.    Please note that this dictation was created using voice recognition software. I have made every reasonable attempt to correct obvious errors, but I expect that there are errors of grammar and possibly content that I did not discover before finalizing the note.

## 2023-05-05 NOTE — ASSESSMENT & PLAN NOTE
Patient is diagnosis is Planter fasciitis  X-rays ordered and viewed by me today show no fracture or recurrent dislocation.  Patient was injected today and I recommend that she follow-up with me in about 2 to 3 months if she is not any better.  Also recommend using some shoe insoles to help with her arch support.